# Patient Record
Sex: MALE | Race: WHITE | NOT HISPANIC OR LATINO | URBAN - METROPOLITAN AREA
[De-identification: names, ages, dates, MRNs, and addresses within clinical notes are randomized per-mention and may not be internally consistent; named-entity substitution may affect disease eponyms.]

---

## 2023-07-05 ENCOUNTER — APPOINTMENT (OUTPATIENT)
Dept: PRIMARY CARE | Facility: CLINIC | Age: 63
End: 2023-07-05
Payer: MEDICARE

## 2023-08-01 ENCOUNTER — OFFICE VISIT (OUTPATIENT)
Dept: PRIMARY CARE | Facility: CLINIC | Age: 63
End: 2023-08-01
Payer: MEDICARE

## 2023-08-01 ENCOUNTER — APPOINTMENT (OUTPATIENT)
Dept: LAB | Facility: LAB | Age: 63
End: 2023-08-01
Payer: MEDICARE

## 2023-08-01 VITALS — SYSTOLIC BLOOD PRESSURE: 130 MMHG | BODY MASS INDEX: 28.89 KG/M2 | DIASTOLIC BLOOD PRESSURE: 80 MMHG | HEIGHT: 72 IN

## 2023-08-01 DIAGNOSIS — E11.9 TYPE 2 DIABETES MELLITUS WITHOUT COMPLICATION, WITHOUT LONG-TERM CURRENT USE OF INSULIN (MULTI): Primary | ICD-10-CM

## 2023-08-01 DIAGNOSIS — E55.9 VITAMIN D DEFICIENCY: ICD-10-CM

## 2023-08-01 DIAGNOSIS — G89.29 CHRONIC BILATERAL BACK PAIN, UNSPECIFIED BACK LOCATION: ICD-10-CM

## 2023-08-01 DIAGNOSIS — B36.0 TINEA VERSICOLOR: ICD-10-CM

## 2023-08-01 DIAGNOSIS — Z12.11 COLON CANCER SCREENING: ICD-10-CM

## 2023-08-01 DIAGNOSIS — M54.9 CHRONIC BILATERAL BACK PAIN, UNSPECIFIED BACK LOCATION: ICD-10-CM

## 2023-08-01 DIAGNOSIS — K21.9 GASTROESOPHAGEAL REFLUX DISEASE, UNSPECIFIED WHETHER ESOPHAGITIS PRESENT: ICD-10-CM

## 2023-08-01 DIAGNOSIS — Z79.899 HIGH RISK MEDICATION USE: ICD-10-CM

## 2023-08-01 DIAGNOSIS — E78.5 DYSLIPIDEMIA: ICD-10-CM

## 2023-08-01 PROBLEM — U07.1 COVID-19: Status: ACTIVE | Noted: 2023-08-01

## 2023-08-01 PROBLEM — G82.50 QUADRIPLEGIA (MULTI): Status: ACTIVE | Noted: 2023-08-01

## 2023-08-01 PROBLEM — Z86.73 HISTORY OF CVA (CEREBROVASCULAR ACCIDENT): Status: ACTIVE | Noted: 2020-02-24

## 2023-08-01 PROBLEM — M75.41 IMPINGEMENT SYNDROME OF RIGHT SHOULDER: Status: ACTIVE | Noted: 2019-01-28

## 2023-08-01 LAB — HBA1C MFR BLD: 7.8 % (ref 4.2–6.5)

## 2023-08-01 PROCEDURE — 99215 OFFICE O/P EST HI 40 MIN: CPT | Performed by: FAMILY MEDICINE

## 2023-08-01 PROCEDURE — 3075F SYST BP GE 130 - 139MM HG: CPT | Performed by: FAMILY MEDICINE

## 2023-08-01 PROCEDURE — 83036 HEMOGLOBIN GLYCOSYLATED A1C: CPT | Performed by: FAMILY MEDICINE

## 2023-08-01 PROCEDURE — 3079F DIAST BP 80-89 MM HG: CPT | Performed by: FAMILY MEDICINE

## 2023-08-01 PROCEDURE — 3051F HG A1C>EQUAL 7.0%<8.0%: CPT | Performed by: FAMILY MEDICINE

## 2023-08-01 PROCEDURE — 1036F TOBACCO NON-USER: CPT | Performed by: FAMILY MEDICINE

## 2023-08-01 RX ORDER — INSULIN GLARGINE 100 [IU]/ML
45 INJECTION, SOLUTION SUBCUTANEOUS NIGHTLY
Qty: 40.5 ML | Refills: 3 | Status: SHIPPED | OUTPATIENT
Start: 2023-08-01 | End: 2023-08-01 | Stop reason: SDUPTHER

## 2023-08-01 RX ORDER — SIMVASTATIN 40 MG/1
TABLET, FILM COATED ORAL
COMMUNITY
Start: 2012-10-29 | End: 2023-08-01 | Stop reason: SDUPTHER

## 2023-08-01 RX ORDER — SIMVASTATIN 40 MG/1
40 TABLET, FILM COATED ORAL DAILY
Qty: 90 TABLET | Refills: 3 | Status: SHIPPED | OUTPATIENT
Start: 2023-08-01 | End: 2024-02-22 | Stop reason: SDUPTHER

## 2023-08-01 RX ORDER — CHOLECALCIFEROL (VITAMIN D3) 25 MCG
1 TABLET ORAL DAILY
COMMUNITY
Start: 2019-03-14

## 2023-08-01 RX ORDER — ASPIRIN 81 MG
1 TABLET, DELAYED RELEASE (ENTERIC COATED) ORAL 2 TIMES DAILY
COMMUNITY
Start: 2019-03-14

## 2023-08-01 RX ORDER — INSULIN GLARGINE 100 [IU]/ML
INJECTION, SOLUTION SUBCUTANEOUS
COMMUNITY
Start: 2019-07-11 | End: 2023-08-01 | Stop reason: SDUPTHER

## 2023-08-01 RX ORDER — NATEGLINIDE 60 MG/1
60 TABLET ORAL 2 TIMES DAILY
COMMUNITY
Start: 2020-02-06 | End: 2023-08-01 | Stop reason: SDUPTHER

## 2023-08-01 RX ORDER — BACLOFEN 10 MG/1
1 TABLET ORAL 4 TIMES DAILY
COMMUNITY
Start: 2019-03-14 | End: 2023-08-01 | Stop reason: SDUPTHER

## 2023-08-01 RX ORDER — ACETAMINOPHEN 325 MG/1
650 TABLET ORAL DAILY PRN
COMMUNITY
Start: 2012-10-12

## 2023-08-01 RX ORDER — NATEGLINIDE 60 MG/1
60 TABLET ORAL 2 TIMES DAILY
Qty: 180 TABLET | Refills: 3 | Status: SHIPPED | OUTPATIENT
Start: 2023-08-01 | End: 2023-08-01 | Stop reason: SDUPTHER

## 2023-08-01 RX ORDER — ASPIRIN 325 MG
1 TABLET ORAL DAILY
COMMUNITY
Start: 2019-03-14 | End: 2024-02-09 | Stop reason: ALTCHOICE

## 2023-08-01 RX ORDER — KETOCONAZOLE 20 MG/ML
SHAMPOO, SUSPENSION TOPICAL
COMMUNITY
Start: 2020-05-08 | End: 2023-08-01 | Stop reason: SDUPTHER

## 2023-08-01 RX ORDER — FAMOTIDINE 20 MG/1
20 TABLET, FILM COATED ORAL DAILY
Qty: 90 TABLET | Refills: 3 | Status: SHIPPED | OUTPATIENT
Start: 2023-08-01 | End: 2023-08-01 | Stop reason: SDUPTHER

## 2023-08-01 RX ORDER — TERBINAFINE HYDROCHLORIDE 250 MG/1
250 TABLET ORAL DAILY
Qty: 42 TABLET | Refills: 0 | Status: SHIPPED | OUTPATIENT
Start: 2023-08-01 | End: 2023-09-12

## 2023-08-01 RX ORDER — BLOOD SUGAR DIAGNOSTIC
STRIP MISCELLANEOUS
COMMUNITY

## 2023-08-01 RX ORDER — ACETAMINOPHEN 500 MG
TABLET ORAL
COMMUNITY
Start: 2019-03-14

## 2023-08-01 RX ORDER — BACLOFEN 10 MG/1
10 TABLET ORAL 4 TIMES DAILY
Qty: 360 TABLET | Refills: 3 | Status: SHIPPED | OUTPATIENT
Start: 2023-08-01 | End: 2023-08-01 | Stop reason: SDUPTHER

## 2023-08-01 RX ORDER — BACLOFEN 10 MG/1
10 TABLET ORAL 4 TIMES DAILY
Qty: 360 TABLET | Refills: 3 | Status: SHIPPED | OUTPATIENT
Start: 2023-08-01 | End: 2024-02-22 | Stop reason: SDUPTHER

## 2023-08-01 RX ORDER — KETOCONAZOLE 20 MG/ML
SHAMPOO, SUSPENSION TOPICAL
Qty: 120 ML | Refills: 5 | Status: SHIPPED | OUTPATIENT
Start: 2023-08-01

## 2023-08-01 RX ORDER — FAMOTIDINE 20 MG/1
20 TABLET, FILM COATED ORAL 2 TIMES DAILY
COMMUNITY
Start: 2012-11-03 | End: 2023-08-01 | Stop reason: SDUPTHER

## 2023-08-01 RX ORDER — SIMVASTATIN 40 MG/1
40 TABLET, FILM COATED ORAL DAILY
Qty: 90 TABLET | Refills: 3 | Status: SHIPPED | OUTPATIENT
Start: 2023-08-01 | End: 2023-08-01 | Stop reason: SDUPTHER

## 2023-08-01 RX ORDER — INSULIN GLARGINE 100 [IU]/ML
50 INJECTION, SOLUTION SUBCUTANEOUS NIGHTLY
Qty: 45 ML | Refills: 3 | Status: SHIPPED | OUTPATIENT
Start: 2023-08-01 | End: 2024-02-19 | Stop reason: SDUPTHER

## 2023-08-01 RX ORDER — NATEGLINIDE 60 MG/1
60 TABLET ORAL 2 TIMES DAILY
Qty: 180 TABLET | Refills: 3 | Status: SHIPPED | OUTPATIENT
Start: 2023-08-01 | End: 2024-02-22 | Stop reason: SDUPTHER

## 2023-08-01 RX ORDER — FAMOTIDINE 20 MG/1
20 TABLET, FILM COATED ORAL DAILY
Qty: 90 TABLET | Refills: 3 | Status: SHIPPED | OUTPATIENT
Start: 2023-08-01 | End: 2024-02-09 | Stop reason: SDUPTHER

## 2023-08-01 ASSESSMENT — PAIN SCALES - GENERAL: PAINLEVEL: 0-NO PAIN

## 2023-08-01 NOTE — PROGRESS NOTES
Subjective   Patient ID: Jian Maldonado is a 63 y.o. male who presents for Follow-up (6 month follow up. ).    HPI   Patient here for 6-month follow-up.  He is wheelchair-bound.  He is accompanied by his wife.  Patient's blood sugars have been in the 2 100-1 50 range.  He has not had any dangerous low sugar episodes.  We did discuss signs and symptoms of these.  Patient complains of a rash on his torso.  He had a similar rash in the past.  Review of Systems    Objective   /80 (BP Location: Right arm, Patient Position: Sitting, BP Cuff Size: Adult)   Ht 1.829 m (6')   BMI 28.89 kg/m²     Physical Exam  Alert, pleasant and in no acute distress.  Heart: Regular rate and rhythm without murmur  Lungs: Clear to auscultation  Lower extremities: No edema  Skin: Scattered oval pinkish macular lesions scattered on the torso.  Assessment/Plan   Problem List Items Addressed This Visit          Cardiac and Vasculature    Dyslipidemia    Relevant Medications    simvastatin (Zocor) 40 mg tablet    Other Relevant Orders    Lipid Panel       Endocrine/Metabolic    Type 2 diabetes mellitus (CMS/HCC) - Primary    Relevant Medications    Lantus U-100 Insulin 100 unit/mL injection    nateglinide (Starlix) 60 mg tablet    Other Relevant Orders    POCT Glycosylated Hemoglobin (HGB A1C) docked device    Comprehensive Metabolic Panel    CBC    Lipid Panel    Vitamin D deficiency    Relevant Orders    Vitamin D 25-Hydroxy,Total       Skin    Tinea versicolor    Relevant Medications    ketoconazole (NIZOral) 2 % shampoo    terbinafine (LamISIL) 250 mg tablet     Other Visit Diagnoses       Colon cancer screening        Relevant Orders    Cologuard® colon cancer screening    High risk medication use        Relevant Orders    Comprehensive Metabolic Panel    CBC    Chronic bilateral back pain, unspecified back location        Relevant Medications    baclofen (Lioresal) 10 mg tablet    Gastroesophageal reflux disease, unspecified  whether esophagitis present        Relevant Medications    famotidine (Pepcid) 20 mg tablet          Patient here for follow-up.  He has multiple medical problems.  1.  Diabetes mellitus type 2: A1c at 7.8..  Recommend increasing the Lantus insulin by 3 units and then 2 or 3 more units to see if we can get the sugars a little better.  Discussed avoiding low sugars.  Plan recheck in 3 to 4 months.  2.  Tinea versicolor: Patient with what appears to be tinea versicolor we will treat with Nizoral shampoo along with terbinafine tablets.  3.  Colon cancer screening: Encourage patient to complete a Cologuard.  Order placed.  4.  Dyslipidemia: Lipid panel ordered  5.  High risk medication use: We will check CBC and CMP.  6.  Vitamin D deficiency: Recheck vitamin D  7.  Chronic back pain: Refill baclofen  8.  GERD continue famotidine  We will contact patient in 3 to 5 days with lab results.  Recommend 24 hour monitor due to physical limitations (wheel chair bound and left sided weakness secondary to stroke)

## 2023-08-11 LAB — NONINV COLON CA DNA+OCC BLD SCRN STL QL: POSITIVE

## 2023-08-13 DIAGNOSIS — R19.5 POSITIVE COLORECTAL CANCER SCREENING USING COLOGUARD TEST: Primary | ICD-10-CM

## 2023-08-17 ENCOUNTER — TELEPHONE (OUTPATIENT)
Dept: PRIMARY CARE | Facility: CLINIC | Age: 63
End: 2023-08-17
Payer: MEDICARE

## 2023-08-17 DIAGNOSIS — R19.5 POSITIVE COLORECTAL CANCER SCREENING USING COLOGUARD TEST: Primary | ICD-10-CM

## 2023-08-17 NOTE — TELEPHONE ENCOUNTER
Pt is UTD on labs and appointment. Scripts pended for approval    Spoke with pt wife regarding referral.

## 2023-09-25 PROBLEM — N40.1 BENIGN PROSTATIC HYPERPLASIA WITH URINARY RETENTION: Status: ACTIVE | Noted: 2020-01-23

## 2023-09-25 PROBLEM — H66.92 LEFT OTITIS MEDIA: Status: ACTIVE | Noted: 2023-09-25

## 2023-09-25 PROBLEM — R33.9 URINARY RETENTION: Status: ACTIVE | Noted: 2023-07-27

## 2023-09-25 PROBLEM — E11.9 TYPE 2 DIABETES MELLITUS WITHOUT COMPLICATION, WITH LONG-TERM CURRENT USE OF INSULIN (MULTI): Status: ACTIVE | Noted: 2017-11-27

## 2023-09-25 PROBLEM — N45.1 EPIDIDYMITIS: Status: ACTIVE | Noted: 2020-01-23

## 2023-09-25 PROBLEM — Z79.4 TYPE 2 DIABETES MELLITUS WITHOUT COMPLICATION, WITH LONG-TERM CURRENT USE OF INSULIN (MULTI): Status: ACTIVE | Noted: 2017-11-27

## 2023-09-25 PROBLEM — R33.8 BENIGN PROSTATIC HYPERPLASIA WITH URINARY RETENTION: Status: ACTIVE | Noted: 2020-01-23

## 2023-09-25 PROBLEM — S43.001A SHOULDER SUBLUXATION, RIGHT: Status: ACTIVE | Noted: 2017-02-14

## 2023-09-25 RX ORDER — SYRINGE,SAFETY WITH NEEDLE,1ML 25GX1"
SYRINGE (EA) MISCELLANEOUS DAILY
COMMUNITY

## 2023-09-25 RX ORDER — SULFAMETHOXAZOLE AND TRIMETHOPRIM 800; 160 MG/1; MG/1
1 TABLET ORAL 2 TIMES DAILY
COMMUNITY
Start: 2022-05-17 | End: 2024-02-09 | Stop reason: ALTCHOICE

## 2023-09-25 RX ORDER — AMOXICILLIN AND CLAVULANATE POTASSIUM 875; 125 MG/1; MG/1
1 TABLET, FILM COATED ORAL EVERY 12 HOURS
COMMUNITY
Start: 2021-10-20 | End: 2024-02-09 | Stop reason: ALTCHOICE

## 2023-09-25 RX ORDER — TERBINAFINE HYDROCHLORIDE 250 MG/1
1 TABLET ORAL DAILY
COMMUNITY
Start: 2017-03-21 | End: 2024-02-09 | Stop reason: ALTCHOICE

## 2023-09-25 RX ORDER — NAPROXEN 500 MG/1
500 TABLET ORAL 2 TIMES DAILY PRN
COMMUNITY
Start: 2019-01-28 | End: 2024-02-09 | Stop reason: ALTCHOICE

## 2023-09-25 RX ORDER — BISACODYL 10 MG/1
SUPPOSITORY RECTAL
COMMUNITY
Start: 2010-08-09

## 2023-09-25 RX ORDER — DOXYCYCLINE 100 MG/1
100 CAPSULE ORAL 2 TIMES DAILY
COMMUNITY
Start: 2020-01-23 | End: 2024-02-09 | Stop reason: ALTCHOICE

## 2023-09-25 RX ORDER — GABAPENTIN 300 MG/1
300 CAPSULE ORAL DAILY
COMMUNITY
Start: 2018-11-06 | End: 2024-02-09 | Stop reason: ALTCHOICE

## 2023-10-02 ENCOUNTER — OFFICE VISIT (OUTPATIENT)
Dept: GASTROENTEROLOGY | Facility: CLINIC | Age: 63
End: 2023-10-02
Payer: MEDICARE

## 2023-10-02 VITALS — DIASTOLIC BLOOD PRESSURE: 74 MMHG | SYSTOLIC BLOOD PRESSURE: 136 MMHG | HEART RATE: 79 BPM

## 2023-10-02 DIAGNOSIS — Z11.59 ENCOUNTER FOR SCREENING FOR OTHER VIRAL DISEASES: Primary | ICD-10-CM

## 2023-10-02 DIAGNOSIS — K59.00 CONSTIPATION, UNSPECIFIED CONSTIPATION TYPE: ICD-10-CM

## 2023-10-02 DIAGNOSIS — R19.5 POSITIVE COLORECTAL CANCER SCREENING USING COLOGUARD TEST: ICD-10-CM

## 2023-10-02 DIAGNOSIS — R12 HEARTBURN: ICD-10-CM

## 2023-10-02 PROCEDURE — 3075F SYST BP GE 130 - 139MM HG: CPT | Performed by: STUDENT IN AN ORGANIZED HEALTH CARE EDUCATION/TRAINING PROGRAM

## 2023-10-02 PROCEDURE — 3051F HG A1C>EQUAL 7.0%<8.0%: CPT | Performed by: STUDENT IN AN ORGANIZED HEALTH CARE EDUCATION/TRAINING PROGRAM

## 2023-10-02 PROCEDURE — 99204 OFFICE O/P NEW MOD 45 MIN: CPT | Performed by: STUDENT IN AN ORGANIZED HEALTH CARE EDUCATION/TRAINING PROGRAM

## 2023-10-02 PROCEDURE — 1036F TOBACCO NON-USER: CPT | Performed by: STUDENT IN AN ORGANIZED HEALTH CARE EDUCATION/TRAINING PROGRAM

## 2023-10-02 PROCEDURE — 3078F DIAST BP <80 MM HG: CPT | Performed by: STUDENT IN AN ORGANIZED HEALTH CARE EDUCATION/TRAINING PROGRAM

## 2023-10-02 NOTE — PROGRESS NOTES
63-year-old gentleman on a wheelchair, with quadriplegia, diabetes dyslipidemia back pain GERD kidney stones presenting after having a positive Cologuard test.  Wife Hanane accompanying him, patient never had colonoscopy before, has a positive family history of colon cancer in his mother at around 60 years of age.  He has chronic constipation with 1 bowel movement every 4 to 7 days.  He mentions chronic heartburn partially controlled on Pepcid at night.      Egd never  Colonoscopy never  Family history reviewed, not pertinent to chief complaint  As above  Denies nsaids, etoh, marijuana, drug use, smoking         The note was created using voice recognition transcription software. Despite proofreading, unintentional typographical errors may be present. Please contact the GI office with any questions or concerns.     Current Medications: reviewed    A 10 point review of system is negative except for what is mentioned in the HPI    Follow up with GI was advised       Vital Signs: Reviewed    Physical Exam:  General: no apparent distress, pleasant and cooperative  Skin:  Warm and dry, no jaundice  HEENT: No scleral icterus, no conjunctival pallor, normocephalic, atraumatic, mucous membranes moist  Neck:  atraumatic, trachea midline, no JVD  Chest:  decreased air entry to auscultation bilaterally. No wheezes, rales, or rhonchi  CV:  Regular rate and rhythm.  Positive S1/S2  Abdomen: no distension, +BS, soft, non-tender to palpation, no rebound tenderness, no guarding, no rigidity, no discernible ascites   Extremities: no lower extremity edema, Chronic pigmentary changes, no cyanosis  Neurological:  A&Ox3 , no asterixis  Psychiatric: cooperative     Investigations:  Labs, radiological imaging and cardiac work up were reviewed        63-year-old gentleman on a wheelchair, with quadriplegia, diabetes dyslipidemia back pain GERD kidney stones presenting after having a positive Cologuard test.  Wife Hanane accompanying  him, patient never had colonoscopy before, has a positive family history of colon cancer in his mother at around 60 years of age.  He has chronic constipation with 1 bowel movement every 4 to 7 days.  He mentions chronic heartburn partially controlled on Pepcid at night.      Egd never  Colonoscopy never  Family history reviewed, not pertinent to chief complaint  As above  Denies nsaids, etoh, marijuana, drug use, smoking       1-born in 1960, screen for hepatitis C    2-healthy lifestyle advised    3-positive Cologuard 8/2023, will need a 2-day prep,  referred as wife needs help to take care of him especially with bowel movements and changing him    4-chronic heartburn partially controlled on Pepcid, lifestyle changes advised, will schedule EGD    5-chronic constipation, start senna Colace 2 tablets twice a day, simethicone as needed, might add MiraLAX

## 2023-10-02 NOTE — PATIENT INSTRUCTIONS
1-no need to screen for hepatitis C with a blood test  2-to help with your bowel movements please start taking senna Colace 2 tablets twice a day, Gas-X over-the-counter 3 or 4 times a day  3-we need to schedule for an upper endoscopy and a colonoscopy.  For 1 week before your procedure please start taking MiraLAX twice a day, and you need a 2-day prep with only clear liquids for 2 days to make sure that your prep is  clean

## 2024-01-30 ENCOUNTER — APPOINTMENT (OUTPATIENT)
Dept: PRIMARY CARE | Facility: CLINIC | Age: 64
End: 2024-01-30
Payer: MEDICARE

## 2024-02-06 ENCOUNTER — APPOINTMENT (OUTPATIENT)
Dept: PRIMARY CARE | Facility: CLINIC | Age: 64
End: 2024-02-06
Payer: MEDICARE

## 2024-02-09 ENCOUNTER — LAB (OUTPATIENT)
Dept: LAB | Facility: LAB | Age: 64
End: 2024-02-09
Payer: MEDICARE

## 2024-02-09 ENCOUNTER — HOME HEALTH ADMISSION (OUTPATIENT)
Dept: HOME HEALTH SERVICES | Facility: HOME HEALTH | Age: 64
End: 2024-02-09

## 2024-02-09 ENCOUNTER — DOCUMENTATION (OUTPATIENT)
Dept: HOME HEALTH SERVICES | Facility: HOME HEALTH | Age: 64
End: 2024-02-09

## 2024-02-09 ENCOUNTER — OFFICE VISIT (OUTPATIENT)
Dept: PRIMARY CARE | Facility: CLINIC | Age: 64
End: 2024-02-09
Payer: MEDICARE

## 2024-02-09 VITALS
HEIGHT: 72 IN | TEMPERATURE: 96.8 F | HEART RATE: 88 BPM | BODY MASS INDEX: 29.12 KG/M2 | SYSTOLIC BLOOD PRESSURE: 134 MMHG | DIASTOLIC BLOOD PRESSURE: 76 MMHG | OXYGEN SATURATION: 95 % | WEIGHT: 215 LBS

## 2024-02-09 DIAGNOSIS — E78.5 DYSLIPIDEMIA: ICD-10-CM

## 2024-02-09 DIAGNOSIS — R26.9 ABNORMALITY OF GAIT: ICD-10-CM

## 2024-02-09 DIAGNOSIS — K21.9 GERD WITHOUT ESOPHAGITIS: ICD-10-CM

## 2024-02-09 DIAGNOSIS — Z23 ENCOUNTER FOR IMMUNIZATION: Primary | ICD-10-CM

## 2024-02-09 DIAGNOSIS — Z86.73 HISTORY OF CVA (CEREBROVASCULAR ACCIDENT): ICD-10-CM

## 2024-02-09 DIAGNOSIS — Z79.4 TYPE 2 DIABETES MELLITUS WITH DIABETIC POLYNEUROPATHY, WITH LONG-TERM CURRENT USE OF INSULIN (MULTI): ICD-10-CM

## 2024-02-09 DIAGNOSIS — R33.9 URINARY RETENTION: ICD-10-CM

## 2024-02-09 DIAGNOSIS — K59.2 NEUROGENIC BOWEL: ICD-10-CM

## 2024-02-09 DIAGNOSIS — E11.42 TYPE 2 DIABETES MELLITUS WITH DIABETIC POLYNEUROPATHY, WITH LONG-TERM CURRENT USE OF INSULIN (MULTI): ICD-10-CM

## 2024-02-09 DIAGNOSIS — N31.9 NEUROGENIC BLADDER: ICD-10-CM

## 2024-02-09 DIAGNOSIS — E55.9 VITAMIN D DEFICIENCY: ICD-10-CM

## 2024-02-09 DIAGNOSIS — K21.9 GASTROESOPHAGEAL REFLUX DISEASE, UNSPECIFIED WHETHER ESOPHAGITIS PRESENT: ICD-10-CM

## 2024-02-09 DIAGNOSIS — R19.5 POSITIVE COLORECTAL CANCER SCREENING USING COLOGUARD TEST: ICD-10-CM

## 2024-02-09 DIAGNOSIS — G82.50 QUADRIPARESIS (MULTI): ICD-10-CM

## 2024-02-09 DIAGNOSIS — I69.959 HEMIPLEGIA OF NONDOMINANT SIDE, LATE EFFECT OF CEREBROVASCULAR DISEASE (MULTI): ICD-10-CM

## 2024-02-09 LAB
ALBUMIN SERPL BCP-MCNC: 4.2 G/DL (ref 3.4–5)
ALP SERPL-CCNC: 52 U/L (ref 33–136)
ALT SERPL W P-5'-P-CCNC: 17 U/L (ref 10–52)
ANION GAP SERPL CALC-SCNC: 13 MMOL/L (ref 10–20)
AST SERPL W P-5'-P-CCNC: 13 U/L (ref 9–39)
BILIRUB SERPL-MCNC: 0.5 MG/DL (ref 0–1.2)
BUN SERPL-MCNC: 14 MG/DL (ref 6–23)
CALCIUM SERPL-MCNC: 10.1 MG/DL (ref 8.6–10.6)
CHLORIDE SERPL-SCNC: 99 MMOL/L (ref 98–107)
CHOLEST SERPL-MCNC: 154 MG/DL (ref 0–199)
CHOLESTEROL/HDL RATIO: 4.1
CO2 SERPL-SCNC: 32 MMOL/L (ref 21–32)
CREAT SERPL-MCNC: 0.78 MG/DL (ref 0.5–1.3)
CREAT UR-MCNC: 52.9 MG/DL (ref 20–370)
EGFRCR SERPLBLD CKD-EPI 2021: >90 ML/MIN/1.73M*2
EST. AVERAGE GLUCOSE BLD GHB EST-MCNC: 209 MG/DL
GLUCOSE SERPL-MCNC: 155 MG/DL (ref 74–99)
HBA1C MFR BLD: 8.9 %
HDLC SERPL-MCNC: 37.3 MG/DL
LDLC SERPL CALC-MCNC: 79 MG/DL
MICROALBUMIN UR-MCNC: 56 MG/L
MICROALBUMIN/CREAT UR: 105.9 UG/MG CREAT
NON HDL CHOLESTEROL: 117 MG/DL (ref 0–149)
POTASSIUM SERPL-SCNC: 4.1 MMOL/L (ref 3.5–5.3)
PROT SERPL-MCNC: 7.9 G/DL (ref 6.4–8.2)
SODIUM SERPL-SCNC: 140 MMOL/L (ref 136–145)
TRIGL SERPL-MCNC: 188 MG/DL (ref 0–149)
VLDL: 38 MG/DL (ref 0–40)

## 2024-02-09 PROCEDURE — 82043 UR ALBUMIN QUANTITATIVE: CPT

## 2024-02-09 PROCEDURE — 82570 ASSAY OF URINE CREATININE: CPT

## 2024-02-09 PROCEDURE — 3052F HG A1C>EQUAL 8.0%<EQUAL 9.0%: CPT | Performed by: NURSE PRACTITIONER

## 2024-02-09 PROCEDURE — 80061 LIPID PANEL: CPT

## 2024-02-09 PROCEDURE — 3060F POS MICROALBUMINURIA REV: CPT | Performed by: NURSE PRACTITIONER

## 2024-02-09 PROCEDURE — 80053 COMPREHEN METABOLIC PANEL: CPT

## 2024-02-09 PROCEDURE — 1036F TOBACCO NON-USER: CPT | Performed by: NURSE PRACTITIONER

## 2024-02-09 PROCEDURE — 36415 COLL VENOUS BLD VENIPUNCTURE: CPT

## 2024-02-09 PROCEDURE — 90686 IIV4 VACC NO PRSV 0.5 ML IM: CPT | Performed by: NURSE PRACTITIONER

## 2024-02-09 PROCEDURE — 3075F SYST BP GE 130 - 139MM HG: CPT | Performed by: NURSE PRACTITIONER

## 2024-02-09 PROCEDURE — 3048F LDL-C <100 MG/DL: CPT | Performed by: NURSE PRACTITIONER

## 2024-02-09 PROCEDURE — 3078F DIAST BP <80 MM HG: CPT | Performed by: NURSE PRACTITIONER

## 2024-02-09 PROCEDURE — 83036 HEMOGLOBIN GLYCOSYLATED A1C: CPT

## 2024-02-09 PROCEDURE — 99214 OFFICE O/P EST MOD 30 MIN: CPT | Performed by: NURSE PRACTITIONER

## 2024-02-09 PROCEDURE — G0008 ADMIN INFLUENZA VIRUS VAC: HCPCS | Performed by: NURSE PRACTITIONER

## 2024-02-09 RX ORDER — FAMOTIDINE 20 MG/1
20 TABLET, FILM COATED ORAL DAILY
Qty: 90 TABLET | Refills: 3 | Status: SHIPPED | OUTPATIENT
Start: 2024-02-09 | End: 2025-02-08

## 2024-02-09 RX ORDER — ASPIRIN 81 MG/1
81 TABLET ORAL DAILY
COMMUNITY

## 2024-02-09 RX ORDER — FLASH GLUCOSE SCANNING READER
EACH MISCELLANEOUS
Qty: 1 EACH | Refills: 0 | Status: SHIPPED | OUTPATIENT
Start: 2024-02-09 | End: 2024-02-16 | Stop reason: SDUPTHER

## 2024-02-09 RX ORDER — FLASH GLUCOSE SENSOR
KIT MISCELLANEOUS
Qty: 1 EACH | Refills: 0 | Status: SHIPPED | OUTPATIENT
Start: 2024-02-09 | End: 2024-02-16 | Stop reason: SDUPTHER

## 2024-02-09 ASSESSMENT — PAIN SCALES - GENERAL: PAINLEVEL: 0-NO PAIN

## 2024-02-09 ASSESSMENT — PATIENT HEALTH QUESTIONNAIRE - PHQ9
2. FEELING DOWN, DEPRESSED OR HOPELESS: NOT AT ALL
1. LITTLE INTEREST OR PLEASURE IN DOING THINGS: NOT AT ALL
SUM OF ALL RESPONSES TO PHQ9 QUESTIONS 1 AND 2: 0

## 2024-02-09 NOTE — HH CARE COORDINATION
This referral has been made a Non Admit with  Home Care due to No Skilled Disciplines Ordered. If you have further questions, feel free to reach out to our office at [phone]. Thank you, [facility].

## 2024-02-09 NOTE — PROGRESS NOTES
Jian Maldonado male   1960 63 y.o.   [id number]    Chief Complaint  Establish Care.    History Of Present Illness  Jian Maldonado is a 63 y.o. male presents today to establish care. Prior PCP Dr Patel.   Accompanied to todays appointment by wife.     Patients chronic conditions reviewed:   Jian reports that he had an accidental fall off latter at age 15 during which he sustained C5-c6 fracture which caused severe neurologic injury, immediately post injury had quadriplegia.  Worked with therapy and over time eventually started walking again w severe limp. Was ambulatory until 2006 when he suffered a CVA.  He currently has L hemiparesis, R LE weakness.  He is wheelchair bound (motorized wheelchair). Requires assistance with dressing, bathing, transferring, bowel and bladder care.     Jian has a chronic arreola catheter due to neurogenic bladder since initial injury (in 1975)- follows w Urologist (Dr Allen ([facility])) routinely.  Patients wife provides catheter care and catheter exchanges monthly.     He uses bisacodyl suppositories to induce bowel movements every 3 days as well as maintenance medication     Additional chronic conditions include chronic joint pains related to arthritis.  Manages pain with tylenol.     DM   Managed on: Lantus 50 at night.  Nateglinide 60 mg BID   A1c: 7.8 on 8/1/23   glucose monitoring: fingerstick - would like to transition to CGM   Urine albumin: urine protein 1/17/23 68.3   Podiatrist: referral ordered   Ophthalmology: referred -- no visits recently   Statin: simvastatin 40 mg   ACEi/ARB: none     Dyslipidemia managed on Simvastatin 40 mg daily.     Patients wife reports that providing care at home is becoming more challenging. Patient needs a bowel prep prior to colonoscopy - wife states she would need assistance to provide bowel prep and proper clean up.    Review of Systems  Review of Systems   Constitutional:  Negative for activity change (wheelchair bound, severe  mobility limitation, L Hemiparesis, R LE weakness), appetite change, chills, diaphoresis, fatigue, fever and unexpected weight change.   HENT:  Negative for congestion, ear pain, mouth sores, nosebleeds, sinus pressure, sinus pain and sore throat.    Eyes:  Negative for pain and redness.   Respiratory:  Negative for cough, chest tightness and shortness of breath.    Cardiovascular:  Negative for chest pain.   Gastrointestinal:  Positive for constipation. Negative for abdominal distention, abdominal pain, blood in stool, nausea and vomiting.   Endocrine: Negative for cold intolerance and heat intolerance.   Genitourinary:  Positive for difficulty urinating (neurogenic bladder- has a chronic arreola).   Musculoskeletal:  Positive for arthralgias and gait problem.   Skin:  Negative for color change, rash and wound.   Neurological:  Positive for weakness and numbness. Negative for headaches.   Psychiatric/Behavioral:  Negative for agitation and confusion. The patient is not nervous/anxious.      Screenings:    Colonoscopy- + cologuard 8/2/23 -- seen by Gastro -- colonoscopy ordered. Unable to complete prep   Immunizations:   Flu- received today    Past Medical History  He has a past medical history of Personal history of other (healed) physical injury and trauma, Personal history of transient ischemic attack (TIA), and cerebral infarction without residual deficits, and Type 2 diabetes mellitus without complications (CMS/[facility]) (01/19/2022).    Surgical History  He has a past surgical history that includes Other surgical history (03/14/2019) and Other surgical history (03/14/2019).    Family History  Family History   Problem Relation Name Age of Onset    Breast cancer Mother      Diabetes type II Father          Social History  He reports that he quit smoking about 18 years ago. His smoking use included cigarettes. He has never used smokeless tobacco. He reports that he does not drink alcohol and does not use  "drugs.    DEPRESSION SCREEN  Over the past 2 weeks, how often have you been bothered by any of the following problems?  Little interest or pleasure in doing things: Not at all  Feeling down, depressed, or hopeless: Not at all    Allergies  Erythromycin, Metformin, Other, Penicillins, and Watermelon    Medications  Current Outpatient Medications   Medication Instructions    acetaminophen (TYLENOL) 650 mg, oral    amoxicillin-pot clavulanate (Augmentin) 875-125 mg tablet 1 tablet, oral, Every 12 hours    aspirin 325 mg tablet 1 tablet, oral, Daily    baclofen (LIORESAL) 10 mg, oral, 4 times daily    bisacodyl (The Magic Bullet) 10 mg suppository rectal, Weekly, Couple times     calcium carbonate-vitamin D3 600 mg-20 mcg (800 unit) tablet oral    cholecalciferol (Vitamin D-3) 25 MCG (1000 UT) tablet 1 tablet, oral, Daily    docusate sodium (Colace) 100 mg tablet 1 tablet, oral, 3 times daily    doxycycline (VIBRAMYCIN) 100 mg, oral, 2 times daily    famotidine (PEPCID) 20 mg, oral, Daily    flash glucose sensor (FREESTYLE ANNETTE 2 SENSOR [facility]) miscellaneous, Use as instructed    gabapentin (NEURONTIN) 300 mg, oral, Daily, For 90 days    insulin syringe-needle U-100 (BD Insulin Syringe Ultra-Fine) 31G X 5/16\" 1 mL syringe subcutaneous, Daily, Use as instructed    ketoconazole (NIZOral) 2 % shampoo Topical, Daily RT    Lantus U-100 Insulin 50 Units, subcutaneous, Nightly    naproxen (NAPROSYN) 500 mg, oral, 2 times daily PRN, With food    nateglinide (STARLIX) 60 mg, oral, 2 times daily    OneTouch Ultra Test strip TEST 4 TIMES DAILy    simvastatin (ZOCOR) 40 mg, oral, Daily    sulfamethoxazole-trimethoprim (Bactrim DS) 800-160 mg tablet 1 tablet, oral, 2 times daily    terbinafine (LamISIL) 250 mg tablet 1 tablet, oral, Daily        Objective   /76   Pulse 88   Temp 36 °C (96.8 °F) (Temporal)   Ht 1.829 m (6')   Wt 97.5 kg (215 lb)   SpO2 95%   BMI 29.16 kg/m²    BMI: Estimated body mass index is 29.16 kg/m² " as calculated from the following:    Height as of this encounter: 1.829 m (6').    Weight as of this encounter: 97.5 kg (215 lb).    Physical Exam  Physical Exam  Vitals and nursing note reviewed.   Constitutional:       Appearance: Normal appearance.   HENT:      Head: Normocephalic and atraumatic.      Nose: Nose normal.      Mouth/Throat:      Mouth: Mucous membranes are moist.      Pharynx: Oropharynx is clear.   Eyes:      Extraocular Movements: Extraocular movements intact.      Conjunctiva/sclera: Conjunctivae normal.      Pupils: Pupils are equal, round, and reactive to light.   Cardiovascular:      Rate and Rhythm: Normal rate and regular rhythm.      Pulses: Normal pulses.      Heart sounds: Normal heart sounds.   Pulmonary:      Effort: Pulmonary effort is normal.      Breath sounds: Normal breath sounds.   Abdominal:      General: Bowel sounds are normal.      Palpations: Abdomen is soft.      Tenderness: There is no abdominal tenderness.   Genitourinary:     Comments: Ochoa catheter in place, urine in leg bag clear, yellow  Musculoskeletal:      Cervical back: Neck supple.   Skin:     General: Skin is warm and dry.      Comments: Many skin tags/moles on neck/torso    Neurological:      Mental Status: He is alert and oriented to person, place, and time.      Comments: L hemiparesis, R LE weakness,    Psychiatric:         Mood and Affect: Mood normal.         Behavior: Behavior normal.         Thought Content: Thought content normal.         Judgment: Judgment normal.         Relevant Results and Imaging  Office Visit on 08/01/2023   Component Date Value Ref Range Status    NONINV COLON CA DNA+OCC BLD SCRN S* 08/02/2023 Positive (A)  Negative Final    Comment:   POSITIVE TEST RESULT. A positive Cologuard result should be followed with a colonoscopy or visual examination of the colon. The normal value (reference range) for this assay is negative.    TEST DESCRIPTION: Composite algorithmic analysis of stool  DNA-biomarkers with hemoglobin immunoassay.   Quantitative values of individual biomarkers are not reportable and are not associated with individual biomarker result reference ranges. Cologuard is intended for colorectal cancer screening of adults of either sex, 45 years or older, who are at average-risk for colorectal cancer (CRC). Cologuard has been approved for use by the U.S. FDA. The performance of Cologuard was established in a cross sectional study of average-risk adults aged 50-84. Cologuard performance in patients ages 45 to 49 years was estimated by sub-group analysis of near-age groups. Colonoscopies performed for a positive result may find as the most clinically significant lesion: colorectal cancer [4.0%], advanced adenoma                            (including sessile serrated polyps greater than or equal to 1cm diameter) [20%] or non- advanced adenoma [31%]; or no colorectal neoplasia [45%]. These estimates are derived from a prospective cross-sectional screening study of 10,000 individuals at average risk for colorectal cancer who were screened with both Cologuard and colonoscopy. (Marisela LAMAR et al, N Engl J Med 2014;370(14):4637-8930.) Cologuard may produce a false negative or false positive result (no colorectal cancer or precancerous polyp present at colonoscopy follow up). A negative Cologuard test result does not guarantee the absence of CRC or advanced adenoma (pre-cancer). The current Cologuard screening interval is every 3 years. (American Cancer Society and U.S. Multi-Society Task Force). Cologuard performance data in a 10,000 patient pivotal study using colonoscopy as the reference method can be accessed at the following location: [email or web address]O/results. Additional description of the Cologuard test process,                            warnings and precautions can be found at [email or web address].com.      POCT Hemoglobin A1C 08/01/2023 7.8 (H)  4.2 - 6.5 % Final     No images are attached to  the encounter.        Assessment and Plan  Assessment/Plan   Problem List Items Addressed This Visit             ICD-10-CM    Abnormality of gait R26.9    Relevant Orders    Referral to Home Care    Dyslipidemia E78.5     Managed on simvastatin   Patient compliant with medication   Lipid panel ordered 2/9/24         Relevant Orders    Lipid Panel (Completed)    GERD without esophagitis K21.9     Managed on Pepcid   Evaluated by GI Dr Armstrong- EGD was recommended but not obtained as of yet          Hemiplegia of nondominant side, late effect of cerebrovascular disease (CMS/[facility]) I69.959    History of CVA (cerebrovascular accident) Z86.73    Neurogenic bladder N31.9     Chronic Indwelling Arreola         Relevant Orders    Referral to Home Care    Neurogenic bowel K59.2     -chronic arreola since 2006  -pt s/p c5-c6 injury w resulting quadraparesis, then CVA in 2006 resuting in further neurologic injury   -follows w Urology annually (Dr Allen ([facility]))  -arreola care done by wife, arreola cath exchanged by wife monthly           Relevant Orders    Referral to Home Care    Quadriparesis (CMS/[facility]) G82.50     Chronic s/p fall at age 15 and CVA in 2006         Relevant Orders    Referral to Home Care    Type 2 diabetes mellitus without complication, with long-term current use of insulin (CMS/[facility]) E11.9, Z79.4     Managed on: Lantus 50 at night.  Nateglinide 60 mg BID   A1c: 7.8 on 8/1/23 - A1c ordered   glucose monitoring: fingerstick - would like to transition to CGM   Urine albumin: urine protein 1/17/23 68.3   Podiatrist: referral ordered   Ophthalmology: referred -- no visits recently   Statin: simvastatin 40 mg   ACEi/ARB: none          Relevant Medications    FreeStyle [place]aden 2 [place] misc    FreeStyle Braden 2 Sensor kit    Vitamin D deficiency E55.9     Vit D level ordered 2/9/24         Relevant Orders    Vitamin D 25-Hydroxy,Total (for eval of Vitamin D levels)    Urinary retention R33.9     Chronic Indwelling Arreola          Positive colorectal cancer screening using Cologuard test R19.5     8/2023 positive cologuard   -was evaluated by GI Dr Armstrong- advised to complete colonoscopy but did not complete as of yet.  Will need a 2-day prep          Other Visit Diagnoses         Codes    Encounter for immunization    -  Primary Z23    Relevant Orders    Flu vaccine, quadrivalent, high-dose, preservative free, age 65y+ (FLUZONE) (Completed)    Gastroesophageal reflux disease, unspecified whether esophagitis present     K21.9    Relevant Medications    famotidine (Pepcid) 20 mg tablet

## 2024-02-15 PROBLEM — R19.5 POSITIVE COLORECTAL CANCER SCREENING USING COLOGUARD TEST: Status: ACTIVE | Noted: 2024-02-15

## 2024-02-15 PROBLEM — H66.92 LEFT OTITIS MEDIA: Status: RESOLVED | Noted: 2023-09-25 | Resolved: 2024-02-15

## 2024-02-15 PROBLEM — U07.1 COVID-19: Status: RESOLVED | Noted: 2023-08-01 | Resolved: 2024-02-15

## 2024-02-15 ASSESSMENT — ENCOUNTER SYMPTOMS
SHORTNESS OF BREATH: 0
EYE REDNESS: 0
BLOOD IN STOOL: 0
HEADACHES: 0
NERVOUS/ANXIOUS: 0
DIAPHORESIS: 0
ARTHRALGIAS: 1
NAUSEA: 0
SINUS PAIN: 0
COUGH: 0
ACTIVITY CHANGE: 0
DIFFICULTY URINATING: 1
SORE THROAT: 0
NUMBNESS: 1
ABDOMINAL DISTENTION: 0
FEVER: 0
CHILLS: 0
AGITATION: 0
APPETITE CHANGE: 0
WOUND: 0
VOMITING: 0
EYE PAIN: 0
UNEXPECTED WEIGHT CHANGE: 0
CHEST TIGHTNESS: 0
COLOR CHANGE: 0
CONSTIPATION: 1
SINUS PRESSURE: 0
WEAKNESS: 1
CONFUSION: 0
ABDOMINAL PAIN: 0
FATIGUE: 0

## 2024-02-16 DIAGNOSIS — E11.42 TYPE 2 DIABETES MELLITUS WITH DIABETIC POLYNEUROPATHY, WITH LONG-TERM CURRENT USE OF INSULIN (MULTI): ICD-10-CM

## 2024-02-16 DIAGNOSIS — Z79.4 TYPE 2 DIABETES MELLITUS WITH DIABETIC POLYNEUROPATHY, WITH LONG-TERM CURRENT USE OF INSULIN (MULTI): ICD-10-CM

## 2024-02-16 RX ORDER — FLASH GLUCOSE SENSOR
KIT MISCELLANEOUS
Qty: 1 EACH | Refills: 0 | Status: SHIPPED | OUTPATIENT
Start: 2024-02-16

## 2024-02-16 RX ORDER — FLASH GLUCOSE SCANNING READER
EACH MISCELLANEOUS
Qty: 1 EACH | Refills: 0 | Status: SHIPPED | OUTPATIENT
Start: 2024-02-16

## 2024-02-16 NOTE — ASSESSMENT & PLAN NOTE
-chronic arreola since 2006  -pt s/p c5-c6 injury w resulting quadraparesis, then CVA in 2006 resuting in further neurologic injury   -follows w Urology annually (Dr Allen ([facility]))  -arreola care done by wife, arreola cath exchanged by wife monthly

## 2024-02-16 NOTE — ASSESSMENT & PLAN NOTE
Managed on: Lantus 50 at night.  Nateglinide 60 mg BID   A1c: 7.8 on 8/1/23 - A1c ordered   glucose monitoring: fingerstick - would like to transition to CGM   Urine albumin: urine protein 1/17/23 68.3   Podiatrist: referral ordered   Ophthalmology: referred -- no visits recently   Statin: simvastatin 40 mg   ACEi/ARB: none

## 2024-02-16 NOTE — ASSESSMENT & PLAN NOTE
8/2023 positive cologuard   -was evaluated by GI Dr Armstrong- advised to complete colonoscopy but did not complete as of yet.  Will need a 2-day prep

## 2024-02-19 ENCOUNTER — TELEPHONE (OUTPATIENT)
Dept: PRIMARY CARE | Facility: CLINIC | Age: 64
End: 2024-02-19
Payer: MEDICARE

## 2024-02-22 ENCOUNTER — LAB (OUTPATIENT)
Dept: LAB | Facility: LAB | Age: 64
End: 2024-02-22
Payer: MEDICARE

## 2024-02-22 DIAGNOSIS — E55.9 VITAMIN D DEFICIENCY: Primary | ICD-10-CM

## 2024-02-22 DIAGNOSIS — E55.9 VITAMIN D DEFICIENCY: ICD-10-CM

## 2024-02-22 DIAGNOSIS — M54.9 CHRONIC BILATERAL BACK PAIN, UNSPECIFIED BACK LOCATION: ICD-10-CM

## 2024-02-22 DIAGNOSIS — E11.9 TYPE 2 DIABETES MELLITUS WITHOUT COMPLICATION, WITHOUT LONG-TERM CURRENT USE OF INSULIN (MULTI): ICD-10-CM

## 2024-02-22 DIAGNOSIS — G89.29 CHRONIC BILATERAL BACK PAIN, UNSPECIFIED BACK LOCATION: ICD-10-CM

## 2024-02-22 DIAGNOSIS — E78.5 DYSLIPIDEMIA: ICD-10-CM

## 2024-02-22 LAB — 25(OH)D3 SERPL-MCNC: 35 NG/ML (ref 30–100)

## 2024-02-22 PROCEDURE — 36415 COLL VENOUS BLD VENIPUNCTURE: CPT

## 2024-02-22 PROCEDURE — 82306 VITAMIN D 25 HYDROXY: CPT

## 2024-02-22 RX ORDER — NATEGLINIDE 60 MG/1
60 TABLET ORAL 2 TIMES DAILY
Qty: 180 TABLET | Refills: 3 | Status: SHIPPED | OUTPATIENT
Start: 2024-02-22 | End: 2025-02-21

## 2024-02-22 RX ORDER — SIMVASTATIN 40 MG/1
40 TABLET, FILM COATED ORAL DAILY
Qty: 90 TABLET | Refills: 3 | Status: SHIPPED | OUTPATIENT
Start: 2024-02-22 | End: 2025-02-21

## 2024-02-22 RX ORDER — BACLOFEN 10 MG/1
10 TABLET ORAL 4 TIMES DAILY
Qty: 360 TABLET | Refills: 3 | Status: SHIPPED | OUTPATIENT
Start: 2024-02-22 | End: 2024-06-10 | Stop reason: SDUPTHER

## 2024-06-10 ENCOUNTER — OFFICE VISIT (OUTPATIENT)
Dept: PRIMARY CARE | Facility: CLINIC | Age: 64
End: 2024-06-10
Payer: MEDICARE

## 2024-06-10 VITALS
SYSTOLIC BLOOD PRESSURE: 115 MMHG | OXYGEN SATURATION: 97 % | HEIGHT: 66 IN | BODY MASS INDEX: 34.7 KG/M2 | DIASTOLIC BLOOD PRESSURE: 78 MMHG | HEART RATE: 88 BPM | TEMPERATURE: 97.8 F

## 2024-06-10 DIAGNOSIS — Z12.5 PROSTATE CANCER SCREENING: ICD-10-CM

## 2024-06-10 DIAGNOSIS — E11.9 TYPE 2 DIABETES MELLITUS WITHOUT COMPLICATION, WITH LONG-TERM CURRENT USE OF INSULIN (MULTI): ICD-10-CM

## 2024-06-10 DIAGNOSIS — N31.9 NEUROGENIC BLADDER: ICD-10-CM

## 2024-06-10 DIAGNOSIS — Z79.4 TYPE 2 DIABETES MELLITUS WITHOUT COMPLICATION, WITH LONG-TERM CURRENT USE OF INSULIN (MULTI): ICD-10-CM

## 2024-06-10 DIAGNOSIS — Z86.73 HISTORY OF CVA (CEREBROVASCULAR ACCIDENT): ICD-10-CM

## 2024-06-10 DIAGNOSIS — E78.5 DYSLIPIDEMIA: ICD-10-CM

## 2024-06-10 DIAGNOSIS — M54.9 CHRONIC BILATERAL BACK PAIN, UNSPECIFIED BACK LOCATION: ICD-10-CM

## 2024-06-10 DIAGNOSIS — E55.9 VITAMIN D DEFICIENCY: ICD-10-CM

## 2024-06-10 DIAGNOSIS — I69.959 HEMIPLEGIA OF NONDOMINANT SIDE, LATE EFFECT OF CEREBROVASCULAR DISEASE (MULTI): ICD-10-CM

## 2024-06-10 DIAGNOSIS — Z78.9 FULL CODE STATUS: ICD-10-CM

## 2024-06-10 DIAGNOSIS — Z11.59 ENCOUNTER FOR HEPATITIS C SCREENING TEST FOR LOW RISK PATIENT: ICD-10-CM

## 2024-06-10 DIAGNOSIS — Z00.00 MEDICARE ANNUAL WELLNESS VISIT, INITIAL: Primary | ICD-10-CM

## 2024-06-10 DIAGNOSIS — K59.2 NEUROGENIC BOWEL: ICD-10-CM

## 2024-06-10 DIAGNOSIS — K21.9 GERD WITHOUT ESOPHAGITIS: ICD-10-CM

## 2024-06-10 DIAGNOSIS — G89.29 CHRONIC BILATERAL BACK PAIN, UNSPECIFIED BACK LOCATION: ICD-10-CM

## 2024-06-10 PROBLEM — N45.1 EPIDIDYMITIS: Status: RESOLVED | Noted: 2020-01-23 | Resolved: 2024-06-10

## 2024-06-10 PROCEDURE — 99497 ADVNCD CARE PLAN 30 MIN: CPT | Performed by: NURSE PRACTITIONER

## 2024-06-10 PROCEDURE — 1158F ADVNC CARE PLAN TLK DOCD: CPT | Performed by: NURSE PRACTITIONER

## 2024-06-10 PROCEDURE — 1036F TOBACCO NON-USER: CPT | Performed by: NURSE PRACTITIONER

## 2024-06-10 PROCEDURE — 3060F POS MICROALBUMINURIA REV: CPT | Performed by: NURSE PRACTITIONER

## 2024-06-10 PROCEDURE — 3078F DIAST BP <80 MM HG: CPT | Performed by: NURSE PRACTITIONER

## 2024-06-10 PROCEDURE — 1123F ACP DISCUSS/DSCN MKR DOCD: CPT | Performed by: NURSE PRACTITIONER

## 2024-06-10 PROCEDURE — 1159F MED LIST DOCD IN RCRD: CPT | Performed by: NURSE PRACTITIONER

## 2024-06-10 PROCEDURE — 3052F HG A1C>EQUAL 8.0%<EQUAL 9.0%: CPT | Performed by: NURSE PRACTITIONER

## 2024-06-10 PROCEDURE — G0438 PPPS, INITIAL VISIT: HCPCS | Performed by: NURSE PRACTITIONER

## 2024-06-10 PROCEDURE — 1170F FXNL STATUS ASSESSED: CPT | Performed by: NURSE PRACTITIONER

## 2024-06-10 PROCEDURE — 3074F SYST BP LT 130 MM HG: CPT | Performed by: NURSE PRACTITIONER

## 2024-06-10 PROCEDURE — 99214 OFFICE O/P EST MOD 30 MIN: CPT | Performed by: NURSE PRACTITIONER

## 2024-06-10 PROCEDURE — 3048F LDL-C <100 MG/DL: CPT | Performed by: NURSE PRACTITIONER

## 2024-06-10 PROCEDURE — 1160F RVW MEDS BY RX/DR IN RCRD: CPT | Performed by: NURSE PRACTITIONER

## 2024-06-10 RX ORDER — BACLOFEN 10 MG/1
10 TABLET ORAL 4 TIMES DAILY
Qty: 360 TABLET | Refills: 3 | Status: SHIPPED | OUTPATIENT
Start: 2024-06-10 | End: 2025-06-10

## 2024-06-10 RX ORDER — INSULIN GLARGINE 100 [IU]/ML
56 INJECTION, SOLUTION SUBCUTANEOUS NIGHTLY
Qty: 18 ML | Refills: 3 | Status: SHIPPED | OUTPATIENT
Start: 2024-06-10 | End: 2025-06-10

## 2024-06-10 ASSESSMENT — PATIENT HEALTH QUESTIONNAIRE - PHQ9
2. FEELING DOWN, DEPRESSED OR HOPELESS: NOT AT ALL
SUM OF ALL RESPONSES TO PHQ9 QUESTIONS 1 AND 2: 0
1. LITTLE INTEREST OR PLEASURE IN DOING THINGS: NOT AT ALL
1. LITTLE INTEREST OR PLEASURE IN DOING THINGS: NOT AT ALL
SUM OF ALL RESPONSES TO PHQ9 QUESTIONS 1 AND 2: 0
2. FEELING DOWN, DEPRESSED OR HOPELESS: NOT AT ALL

## 2024-06-10 ASSESSMENT — ACTIVITIES OF DAILY LIVING (ADL)
TAKING_MEDICATION: INDEPENDENT
DRESSING: NEEDS ASSISTANCE
DOING_HOUSEWORK: TOTAL CARE
GROCERY_SHOPPING: TOTAL CARE
BATHING: NEEDS ASSISTANCE
MANAGING_FINANCES: INDEPENDENT

## 2024-06-10 NOTE — PROGRESS NOTES
Jian Maldonado male   1960 64 y.o.   [id number]    Chief Complaint  Medicare Annual Wellness Visit Initial.    History Of Present Illness  Jian Maldonado is a 64 y.o. male presents today for initial MCWV    Chronic conditions reviewed:   HLD  Remains compliant with simvastatin.  Tolerating medication well    Diabetes  During last visit transitioned to continuous glucose monitoring and very happy with the chyna 2.  Occasionally does have issues with sensor falling off.  Continues currently on Lantus 56 units at night.  Blood sugars well managed per CGM he is in target 73%, above target 27%, and below target 0% over the last 90 days.  Denies hypoglycemia.  Due to insurance/ formulary change transitioning from Lantus to Basaglar.  Continues on nateglinide 60 mg twice daily    Continues on vitamin D supplement.    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  Continues to follow with urology annually.        Acute concerns today: none  Review of Systems  Review of Systems   Constitutional:  Negative for activity change (wheelchair bound, severe mobility limitation, L Hemiparesis, R LE weakness), appetite change, chills, diaphoresis, fatigue, fever and unexpected weight change.   HENT:  Negative for congestion, ear pain, mouth sores, nosebleeds, sinus pressure, sinus pain and sore throat.    Eyes:  Negative for pain and redness.   Respiratory:  Negative for cough, chest tightness and shortness of breath.    Cardiovascular:  Negative for chest pain.   Gastrointestinal:  Positive for constipation. Negative for abdominal distention, abdominal pain, blood in stool, nausea and vomiting.   Endocrine: Negative for cold intolerance and heat intolerance.   Genitourinary:  Positive for difficulty urinating (neurogenic bladder- has a chronic arreola).   Musculoskeletal:  Positive for arthralgias and gait problem.   Skin:   Negative for color change, rash and wound.   Neurological:  Positive for weakness and numbness. Negative for headaches.   Psychiatric/Behavioral:  Negative for agitation and confusion. The patient is not nervous/anxious.        Diet: fair   Exercise: wheelchair bound, L hemiparesis        Screenings:    Colonoscopy- + cologuard 8/2/23 -- seen by Gastro -- colonoscopy ordered. Unable to complete prep   Immunizations:   Flu-uptd, recommended in fall again   Tdap- uptd due in 2033   Covid-booster recommended   Pneum- uptd   Shingles-uptd    Past Medical History  He has a past medical history of COVID-19 (08/01/2023), Nephrolithiasis, Personal history of other (healed) physical injury and trauma, Personal history of transient ischemic attack (TIA), and cerebral infarction without residual deficits, and Type 2 diabetes mellitus without complications (Multi) (01/19/2022).    Surgical History  He has a past surgical history that includes Other surgical history (03/14/2019) and Other surgical history (03/14/2019).    Family History  Family History   Problem Relation Name Age of Onset    Breast cancer Mother      Diabetes type II Father          Social History  He reports that he quit smoking about 18 years ago. His smoking use included cigarettes. He has never used smokeless tobacco. He reports that he does not drink alcohol and does not use drugs.    DEPRESSION SCREEN  Over the past 2 weeks, how often have you been bothered by any of the following problems?  Little interest or pleasure in doing things: Not at all  Feeling down, depressed, or hopeless: Not at all    Allergies  Erythromycin, Metformin, Other, Penicillins, and Watermelon    Medications  Current Outpatient Medications   Medication Instructions    acetaminophen (TYLENOL) 650 mg, oral, Daily PRN    aspirin 81 mg, oral, Daily    baclofen (LIORESAL) 10 mg, oral, 4 times daily    Basaglar KwikPen U-100 Insulin 56 Units, subcutaneous, Nightly, Take as directed per  "insulin instructions.    bisacodyl (The Magic Bullet) 10 mg suppository rectal, Once Weekly, Couple times     calcium carbonate-vitamin D3 600 mg-20 mcg (800 unit) tablet oral    cholecalciferol (Vitamin D-3) 25 MCG (1000 UT) tablet 1 tablet, oral, Daily    docusate sodium (Colace) 100 mg tablet 1 tablet, oral, 2 times daily    famotidine (PEPCID) 20 mg, oral, Daily    FreeStyle Braden 2 Cassel misc Use as instructed    FreeStyle Braden 2 Sensor kit Use as instructed    insulin syringe-needle U-100 (BD Insulin Syringe Ultra-Fine) 31G X 5/16\" 1 mL syringe subcutaneous, Daily, Use as instructed    ketoconazole (NIZOral) 2 % shampoo Topical, Daily RT    nateglinide (STARLIX) 60 mg, oral, 2 times daily    OneTouch Ultra Test strip TEST 4 TIMES DAILy    simvastatin (ZOCOR) 40 mg, oral, Daily        Objective   /78 (BP Location: Left arm, Patient Position: Sitting, BP Cuff Size: Adult)   Pulse 88   Temp 36.6 °C (97.8 °F) (Temporal)   Ht 1.676 m (5' 6\")   SpO2 97%   BMI 34.70 kg/m²    BMI: Estimated body mass index is 34.7 kg/m² as calculated from the following:    Height as of this encounter: 1.676 m (5' 6\").    Weight as of 2/9/24: 97.5 kg (215 lb).    Physical Exam  Physical Exam  Vitals and nursing note reviewed.   Constitutional:       Appearance: Normal appearance. He is obese.   HENT:      Head: Normocephalic and atraumatic.      Nose: Nose normal.      Mouth/Throat:      Mouth: Mucous membranes are moist.      Pharynx: Oropharynx is clear.   Eyes:      Extraocular Movements: Extraocular movements intact.      Conjunctiva/sclera: Conjunctivae normal.      Pupils: Pupils are equal, round, and reactive to light.   Cardiovascular:      Rate and Rhythm: Normal rate and regular rhythm.      Pulses: Normal pulses.      Heart sounds: Normal heart sounds.   Pulmonary:      Effort: Pulmonary effort is normal.      Breath sounds: Normal breath sounds.   Abdominal:      General: Bowel sounds are normal.      " Palpations: Abdomen is soft.      Tenderness: There is no abdominal tenderness.   Genitourinary:     Comments: Ochoa catheter in place, urine in leg bag clear, yellow  Musculoskeletal:      Cervical back: Neck supple.   Skin:     General: Skin is warm and dry.      Comments: Many skin tags/moles on neck/torso    Neurological:      Mental Status: He is alert and oriented to person, place, and time.      Motor: Weakness present.      Comments: L hemiparesis, R LE weakness,    Psychiatric:         Mood and Affect: Mood normal.         Behavior: Behavior normal.         Thought Content: Thought content normal.         Judgment: Judgment normal.         Relevant Results and Imaging  Lab on 02/22/2024   Component Date Value Ref Range Status    Vitamin D, 25-Hydroxy, Total 02/22/2024 35  30 - 100 ng/mL Final   Lab on 02/09/2024   Component Date Value Ref Range Status    Hemoglobin A1C 02/09/2024 8.9 (H)  see below % Final    Estimated Average Glucose 02/09/2024 209  Not Established mg/dL Final    Albumin, Urine Random 02/09/2024 56.0  Not established mg/L Final    Creatinine, Urine Random 02/09/2024 52.9  20.0 - 370.0 mg/dL Final    Albumin/Creatine Ratio 02/09/2024 105.9 (H)  <30.0 ug/mg Creat Final    Glucose 02/09/2024 155 (H)  74 - 99 mg/dL Final    Sodium 02/09/2024 140  136 - 145 mmol/L Final    Potassium 02/09/2024 4.1  3.5 - 5.3 mmol/L Final    Chloride 02/09/2024 99  98 - 107 mmol/L Final    Bicarbonate 02/09/2024 32  21 - 32 mmol/L Final    Anion Gap 02/09/2024 13  10 - 20 mmol/L Final    Urea Nitrogen 02/09/2024 14  6 - 23 mg/dL Final    Creatinine 02/09/2024 0.78  0.50 - 1.30 mg/dL Final    eGFR 02/09/2024 >90  >60 mL/min/1.73m*2 Final    Calculations of estimated GFR are performed using the 2021 CKD-EPI Study Refit equation without the race variable for the IDMS-Traceable creatinine methods.  https://jasn.asnjournals.org/content/early/2021/09/22/ASN.4889123991    Calcium 02/09/2024 10.1  8.6 - 10.6 mg/dL Final     Albumin 02/09/2024 4.2  3.4 - 5.0 g/dL Final    Alkaline Phosphatase 02/09/2024 52  33 - 136 U/L Final    Total Protein 02/09/2024 7.9  6.4 - 8.2 g/dL Final    AST 02/09/2024 13  9 - 39 U/L Final    Bilirubin, Total 02/09/2024 0.5  0.0 - 1.2 mg/dL Final    ALT 02/09/2024 17  10 - 52 U/L Final    Patients treated with Sulfasalazine may generate falsely decreased results for ALT.    Cholesterol 02/09/2024 154  0 - 199 mg/dL Final          Age      Desirable   Borderline High   High     0-19 Y     0 - 169       170 - 199     >/= 200    20-24 Y     0 - 189       190 - 224     >/= 225         >24 Y     0 - 199       200 - 239     >/= 240   **All ranges are based on fasting samples. Specific   therapeutic targets will vary based on patient-specific   cardiac risk.    Pediatric guidelines reference:Pediatrics 2011, 128(S5).Adult guidelines reference: NCEP ATPIII Guidelines,DENISE 2001, 258:2486-97    Venipuncture immediately after or during the administration of Metamizole may lead to falsely low results. Testing should be performed immediately prior to Metamizole dosing.    HDL-Cholesterol 02/09/2024 37.3  mg/dL Final      Age       Very Low   Low     Normal    High    0-19 Y    < 35      < 40     40-45     ----  20-24 Y    ----     < 40      >45      ----        >24 Y      ----     < 40     40-60      >60      Cholesterol/HDL Ratio 02/09/2024 4.1   Final      Ref Values  Desirable  < 3.4  High Risk  > 5.0    LDL Calculated 02/09/2024 79  <=99 mg/dL Final                                Near   Borderline      AGE      Desirable  Optimal    High     High     Very High     0-19 Y     0 - 109     ---    110-129   >/= 130     ----    20-24 Y     0 - 119     ---    120-159   >/= 160     ----      >24 Y     0 -  99   100-129  130-159   160-189     >/=190      VLDL 02/09/2024 38  0 - 40 mg/dL Final    Triglycerides 02/09/2024 188 (H)  0 - 149 mg/dL Final       Age         Desirable   Borderline High   High     Very High   0 D-90  D    19 - 174         ----         ----        ----  91 D- 9 Y     0 -  74        75 -  99     >/= 100      ----    10-19 Y     0 -  89        90 - 129     >/= 130      ----    20-24 Y     0 - 114       115 - 149     >/= 150      ----         >24 Y     0 - 149       150 - 199    200- 499    >/= 500    Venipuncture immediately after or during the administration of Metamizole may lead to falsely low results. Testing should be performed immediately prior to Metamizole dosing.    Non HDL Cholesterol 02/09/2024 117  0 - 149 mg/dL Final          Age       Desirable   Borderline High   High     Very High     0-19 Y     0 - 119       120 - 144     >/= 145    >/= 160    20-24 Y     0 - 149       150 - 189     >/= 190      ----         >24 Y    30 mg/dL above LDL Cholesterol goal     Office Visit on 08/01/2023   Component Date Value Ref Range Status    NONINV COLON CA DNA+OCC BLD SCRN S* 08/02/2023 Positive (A)  Negative Final    Comment:   POSITIVE TEST RESULT. A positive Cologuard result should be followed with a colonoscopy or visual examination of the colon. The normal value (reference range) for this assay is negative.    TEST DESCRIPTION: Composite algorithmic analysis of stool DNA-biomarkers with hemoglobin immunoassay.   Quantitative values of individual biomarkers are not reportable and are not associated with individual biomarker result reference ranges. Cologuard is intended for colorectal cancer screening of adults of either sex, 45 years or older, who are at average-risk for colorectal cancer (CRC). Cologuard has been approved for use by the U.S. FDA. The performance of Cologuard was established in a cross sectional study of average-risk adults aged 50-84. Cologuard performance in patients ages 45 to 49 years was estimated by sub-group analysis of near-age groups. Colonoscopies performed for a positive result may find as the most clinically significant lesion: colorectal cancer [4.0%], advanced adenoma                             (including sessile serrated polyps greater than or equal to 1cm diameter) [20%] or non- advanced adenoma [31%]; or no colorectal neoplasia [45%]. These estimates are derived from a prospective cross-sectional screening study of 10,000 individuals at average risk for colorectal cancer who were screened with both Cologuard and colonoscopy. (Marisela Mendoza al, N Engl J Med 2014;370(14):2527-7878.) Cologuard may produce a false negative or false positive result (no colorectal cancer or precancerous polyp present at colonoscopy follow up). A negative Cologuard test result does not guarantee the absence of CRC or advanced adenoma (pre-cancer). The current Cologuard screening interval is every 3 years. (American Cancer Society and U.S. Multi-Society Task Force). Cologuard performance data in a 10,000 patient pivotal study using colonoscopy as the reference method can be accessed at the following location: [email or web address]/results. Additional description of the Cologuard test process,                            warnings and precautions can be found at www.cologuard.com.      POCT Hemoglobin A1C 08/01/2023 7.8 (H)  4.2 - 6.5 % Final     No images are attached to the encounter.        Assessment and Plan  Assessment/Plan   Problem List Items Addressed This Visit             ICD-10-CM    Hemiplegia of nondominant side, late effect of cerebrovascular disease (Multi) I69.959    Relevant Orders    Referral to Social Work    History of CVA (cerebrovascular accident) Z86.73    Dyslipidemia E78.5     Stable  Managed on simvastatin   Patient compliant with medication   Lipid panel stable 2/9/24         Relevant Orders    Comprehensive Metabolic Panel    GERD without esophagitis K21.9     Managed on Pepcid   Evaluated by GI Dr Armstrong- EGD was recommended but not obtained as of yet          Neurogenic bladder N31.9     Chronic Indwelling Arreola  -chronic arreola since 2006  -pt s/p c5-c6 injury w resulting quadraparesis,  then CVA in 2006 resuting in further neurologic injury   -follows w Urology annually (Dr Allen ([facility]))  -arreola care done by wife, arreola cath exchanged by wife monthly          Relevant Orders    Referral to Social Work    RESOLVED: Neurogenic bowel K59.2    Type 2 diabetes mellitus without complication, with long-term current use of insulin (Multi) E11.9, Z79.4     Managed on: Basaglar 56 at night.  Nateglinide 60 mg BID   A1c: 8.9 on 2/23/24 - A1c ordered today 6/10/24  glucose monitoring:  CGM chyna 2   in target 73%, above target 27%, and below target 0% over the last 90 days.  Denies hypoglycemia.  Urine albumin: urine protein 1/17/23 68.3 -will repeat at next visit  Podiatrist: referral ordered   Ophthalmology: referred -- no visits recently   Statin: simvastatin 40 mg   ACEi/ARB: none          Relevant Medications    insulin glargine (Basaglar KwikPen U-100 Insulin) 100 unit/mL (3 mL) pen    Other Relevant Orders    Hemoglobin A1C    Vitamin D deficiency E55.9     Vit D level normal 2/22/24  Cont vit D 1000 international units  daily              Other Visit Diagnoses         Codes    Medicare annual wellness visit, initial    -  Primary Z00.00    Chronic bilateral back pain, unspecified back location     M54.9, G89.29    Relevant Medications    baclofen (Lioresal) 10 mg tablet    Encounter for hepatitis C screening test for low risk patient     Z11.59    Relevant Orders    Hepatitis C antibody    Prostate cancer screening     Z12.5    Relevant Orders    Prostate Specific Antigen, Screen    Full code status     Z78.9    Relevant Orders    Full code (Completed)          Refills provided

## 2024-06-11 ASSESSMENT — ENCOUNTER SYMPTOMS
NAUSEA: 0
SINUS PAIN: 0
WOUND: 0
VOMITING: 0
HEADACHES: 0
APPETITE CHANGE: 0
COUGH: 0
FEVER: 0
BLOOD IN STOOL: 0
CHEST TIGHTNESS: 0
EYE REDNESS: 0
SHORTNESS OF BREATH: 0
CONFUSION: 0
UNEXPECTED WEIGHT CHANGE: 0
DIFFICULTY URINATING: 1
EYE PAIN: 0
NUMBNESS: 1
AGITATION: 0
ABDOMINAL DISTENTION: 0
SINUS PRESSURE: 0
WEAKNESS: 1
SORE THROAT: 0
FATIGUE: 0
CONSTIPATION: 1
CHILLS: 0
ABDOMINAL PAIN: 0
ARTHRALGIAS: 1
DIAPHORESIS: 0
ACTIVITY CHANGE: 0
NERVOUS/ANXIOUS: 0
COLOR CHANGE: 0

## 2024-06-11 NOTE — ASSESSMENT & PLAN NOTE
Managed on: Basaglar 56 at night.  Nateglinide 60 mg BID   A1c: 8.9 on 2/23/24 - A1c ordered today 6/10/24  glucose monitoring:  CGM chyna 2   in target 73%, above target 27%, and below target 0% over the last 90 days.  Denies hypoglycemia.  Urine albumin: urine protein 1/17/23 68.3 -will repeat at next visit  Podiatrist: referral ordered   Ophthalmology: referred -- no visits recently   Statin: simvastatin 40 mg   ACEi/ARB: none

## 2024-06-11 NOTE — ASSESSMENT & PLAN NOTE
Chronic Indwelling Arreola  -chronic arreola since 2006  -pt s/p c5-c6 injury w resulting quadraparesis, then CVA in 2006 resuting in further neurologic injury   -follows w Urology annually (Dr Allen ([facility]))  -arreola care done by wife, arreola cath exchanged by wife monthly

## 2024-06-11 NOTE — ASSESSMENT & PLAN NOTE
8/2023 positive cologuard   -was evaluated by GI Dr Armstrong- advised to complete colonoscopy but did not complete as of yet.  Will need a 2-day prep  -Patient is unable to complete the prep at home  -Wife is looking for assistance-social work consult was placed during prior visit, wife states nobody ever reached out to them.  -Social work consult placed again 6/11/24

## 2024-06-20 ENCOUNTER — LAB (OUTPATIENT)
Dept: LAB | Facility: LAB | Age: 64
End: 2024-06-20
Payer: MEDICARE

## 2024-06-20 DIAGNOSIS — E11.9 TYPE 2 DIABETES MELLITUS WITHOUT COMPLICATION, WITH LONG-TERM CURRENT USE OF INSULIN (MULTI): ICD-10-CM

## 2024-06-20 DIAGNOSIS — Z79.4 TYPE 2 DIABETES MELLITUS WITHOUT COMPLICATION, WITH LONG-TERM CURRENT USE OF INSULIN (MULTI): ICD-10-CM

## 2024-06-20 DIAGNOSIS — Z11.59 ENCOUNTER FOR HEPATITIS C SCREENING TEST FOR LOW RISK PATIENT: ICD-10-CM

## 2024-06-20 DIAGNOSIS — E78.5 DYSLIPIDEMIA: ICD-10-CM

## 2024-06-20 DIAGNOSIS — Z12.5 PROSTATE CANCER SCREENING: ICD-10-CM

## 2024-06-20 LAB
ALBUMIN SERPL BCP-MCNC: 4.1 G/DL (ref 3.4–5)
ALP SERPL-CCNC: 45 U/L (ref 33–136)
ALT SERPL W P-5'-P-CCNC: 16 U/L (ref 10–52)
ANION GAP SERPL CALC-SCNC: 13 MMOL/L (ref 10–20)
AST SERPL W P-5'-P-CCNC: 12 U/L (ref 9–39)
BILIRUB SERPL-MCNC: 0.5 MG/DL (ref 0–1.2)
BUN SERPL-MCNC: 15 MG/DL (ref 6–23)
CALCIUM SERPL-MCNC: 9.8 MG/DL (ref 8.6–10.6)
CHLORIDE SERPL-SCNC: 99 MMOL/L (ref 98–107)
CO2 SERPL-SCNC: 32 MMOL/L (ref 21–32)
CREAT SERPL-MCNC: 0.72 MG/DL (ref 0.5–1.3)
EGFRCR SERPLBLD CKD-EPI 2021: >90 ML/MIN/1.73M*2
EST. AVERAGE GLUCOSE BLD GHB EST-MCNC: 166 MG/DL
GLUCOSE SERPL-MCNC: 135 MG/DL (ref 74–99)
HBA1C MFR BLD: 7.4 %
HCV AB SER QL: NONREACTIVE
POTASSIUM SERPL-SCNC: 4.6 MMOL/L (ref 3.5–5.3)
PROT SERPL-MCNC: 7.7 G/DL (ref 6.4–8.2)
PSA SERPL-MCNC: 2.34 NG/ML
SODIUM SERPL-SCNC: 139 MMOL/L (ref 136–145)

## 2024-06-20 PROCEDURE — 83036 HEMOGLOBIN GLYCOSYLATED A1C: CPT

## 2024-06-20 PROCEDURE — 80053 COMPREHEN METABOLIC PANEL: CPT

## 2024-06-20 PROCEDURE — 36415 COLL VENOUS BLD VENIPUNCTURE: CPT

## 2024-06-20 PROCEDURE — G0103 PSA SCREENING: HCPCS

## 2024-06-20 PROCEDURE — 86803 HEPATITIS C AB TEST: CPT

## 2024-10-11 ENCOUNTER — APPOINTMENT (OUTPATIENT)
Dept: PRIMARY CARE | Facility: CLINIC | Age: 64
End: 2024-10-11
Payer: MEDICARE